# Patient Record
Sex: FEMALE | Race: WHITE | NOT HISPANIC OR LATINO | URBAN - METROPOLITAN AREA
[De-identification: names, ages, dates, MRNs, and addresses within clinical notes are randomized per-mention and may not be internally consistent; named-entity substitution may affect disease eponyms.]

---

## 2017-06-19 ENCOUNTER — OUTPATIENT (OUTPATIENT)
Dept: OUTPATIENT SERVICES | Facility: HOSPITAL | Age: 72
LOS: 1 days | Discharge: HOME | End: 2017-06-19

## 2017-06-19 DIAGNOSIS — C50.919 MALIGNANT NEOPLASM OF UNSPECIFIED SITE OF UNSPECIFIED FEMALE BREAST: ICD-10-CM

## 2017-06-22 ENCOUNTER — RX RENEWAL (OUTPATIENT)
Age: 72
End: 2017-06-22

## 2017-06-22 ENCOUNTER — RESULT REVIEW (OUTPATIENT)
Age: 72
End: 2017-06-22

## 2017-06-22 ENCOUNTER — APPOINTMENT (OUTPATIENT)
Dept: HEMATOLOGY ONCOLOGY | Facility: CLINIC | Age: 72
End: 2017-06-22

## 2017-06-22 VITALS
HEART RATE: 60 BPM | WEIGHT: 154.32 LBS | RESPIRATION RATE: 16 BRPM | BODY MASS INDEX: 24.72 KG/M2 | SYSTOLIC BLOOD PRESSURE: 120 MMHG | OXYGEN SATURATION: 97 % | DIASTOLIC BLOOD PRESSURE: 60 MMHG | TEMPERATURE: 97.7 F

## 2017-06-22 LAB
HCT VFR BLD CALC: 38 % — SIGNIFICANT CHANGE UP (ref 34.5–45)
HGB BLD-MCNC: 13.1 G/DL — SIGNIFICANT CHANGE UP (ref 11.5–15.5)
MCHC RBC-ENTMCNC: 31.8 PG — SIGNIFICANT CHANGE UP (ref 27–34)
MCHC RBC-ENTMCNC: 34.5 G/DL — SIGNIFICANT CHANGE UP (ref 32–36)
MCV RBC AUTO: 92.1 FL — SIGNIFICANT CHANGE UP (ref 80–100)
PLATELET # BLD AUTO: 301 K/UL — SIGNIFICANT CHANGE UP (ref 150–400)
RBC # BLD: 4.12 M/UL — SIGNIFICANT CHANGE UP (ref 3.8–5.2)
RBC # FLD: 12.2 % — SIGNIFICANT CHANGE UP (ref 10.3–14.5)
WBC # BLD: 8.6 K/UL — SIGNIFICANT CHANGE UP (ref 3.8–10.5)
WBC # FLD AUTO: 8.6 K/UL — SIGNIFICANT CHANGE UP (ref 3.8–10.5)

## 2017-07-14 ENCOUNTER — APPOINTMENT (OUTPATIENT)
Dept: CARDIOLOGY | Facility: CLINIC | Age: 72
End: 2017-07-14

## 2017-11-15 ENCOUNTER — MEDICATION RENEWAL (OUTPATIENT)
Age: 72
End: 2017-11-15

## 2017-12-13 ENCOUNTER — OUTPATIENT (OUTPATIENT)
Dept: OUTPATIENT SERVICES | Facility: HOSPITAL | Age: 72
LOS: 1 days | Discharge: HOME | End: 2017-12-13

## 2017-12-13 DIAGNOSIS — C50.919 MALIGNANT NEOPLASM OF UNSPECIFIED SITE OF UNSPECIFIED FEMALE BREAST: ICD-10-CM

## 2017-12-19 ENCOUNTER — APPOINTMENT (OUTPATIENT)
Dept: HEMATOLOGY ONCOLOGY | Facility: CLINIC | Age: 72
End: 2017-12-19
Payer: MEDICARE

## 2017-12-19 VITALS
SYSTOLIC BLOOD PRESSURE: 130 MMHG | RESPIRATION RATE: 16 BRPM | DIASTOLIC BLOOD PRESSURE: 71 MMHG | OXYGEN SATURATION: 98 % | TEMPERATURE: 99.2 F | BODY MASS INDEX: 24.05 KG/M2 | WEIGHT: 150.13 LBS | HEART RATE: 78 BPM

## 2017-12-19 DIAGNOSIS — E21.3 HYPERPARATHYROIDISM, UNSPECIFIED: ICD-10-CM

## 2017-12-19 PROCEDURE — 99215 OFFICE O/P EST HI 40 MIN: CPT

## 2017-12-19 RX ORDER — ALENDRONATE SODIUM 70 MG/1
70 TABLET ORAL
Refills: 0 | Status: DISCONTINUED | COMMUNITY
Start: 2017-06-22 | End: 2017-12-19

## 2017-12-19 RX ORDER — NYSTATIN 100000 [USP'U]/G
100000 CREAM TOPICAL
Qty: 30 | Refills: 0 | Status: ACTIVE | COMMUNITY
Start: 2017-07-13

## 2018-04-05 ENCOUNTER — MEDICATION RENEWAL (OUTPATIENT)
Age: 73
End: 2018-04-05

## 2018-06-04 ENCOUNTER — OUTPATIENT (OUTPATIENT)
Dept: OUTPATIENT SERVICES | Facility: HOSPITAL | Age: 73
LOS: 1 days | Discharge: HOME | End: 2018-06-04

## 2018-06-04 DIAGNOSIS — C50.919 MALIGNANT NEOPLASM OF UNSPECIFIED SITE OF UNSPECIFIED FEMALE BREAST: ICD-10-CM

## 2018-06-07 ENCOUNTER — APPOINTMENT (OUTPATIENT)
Dept: HEMATOLOGY ONCOLOGY | Facility: CLINIC | Age: 73
End: 2018-06-07
Payer: MEDICARE

## 2018-06-07 VITALS
HEART RATE: 78 BPM | SYSTOLIC BLOOD PRESSURE: 110 MMHG | TEMPERATURE: 98.8 F | WEIGHT: 145.48 LBS | DIASTOLIC BLOOD PRESSURE: 70 MMHG | BODY MASS INDEX: 23.31 KG/M2 | OXYGEN SATURATION: 98 % | RESPIRATION RATE: 16 BRPM

## 2018-06-07 PROCEDURE — 99214 OFFICE O/P EST MOD 30 MIN: CPT

## 2018-07-06 ENCOUNTER — APPOINTMENT (OUTPATIENT)
Dept: CARDIOLOGY | Facility: CLINIC | Age: 73
End: 2018-07-06

## 2018-08-24 ENCOUNTER — APPOINTMENT (OUTPATIENT)
Dept: CARDIOLOGY | Facility: CLINIC | Age: 73
End: 2018-08-24
Payer: MEDICARE

## 2018-08-24 VITALS
OXYGEN SATURATION: 99 % | SYSTOLIC BLOOD PRESSURE: 110 MMHG | HEART RATE: 68 BPM | WEIGHT: 147 LBS | BODY MASS INDEX: 23.63 KG/M2 | HEIGHT: 66 IN | DIASTOLIC BLOOD PRESSURE: 52 MMHG

## 2018-08-24 DIAGNOSIS — Z01.810 ENCOUNTER FOR PREPROCEDURAL CARDIOVASCULAR EXAMINATION: ICD-10-CM

## 2018-08-24 DIAGNOSIS — I34.1 NONRHEUMATIC MITRAL (VALVE) PROLAPSE: ICD-10-CM

## 2018-08-24 DIAGNOSIS — R09.89 OTHER SPECIFIED SYMPTOMS AND SIGNS INVOLVING THE CIRCULATORY AND RESPIRATORY SYSTEMS: ICD-10-CM

## 2018-08-24 DIAGNOSIS — I65.23 OCCLUSION AND STENOSIS OF BILATERAL CAROTID ARTERIES: ICD-10-CM

## 2018-08-24 PROCEDURE — 99214 OFFICE O/P EST MOD 30 MIN: CPT

## 2018-12-06 ENCOUNTER — OUTPATIENT (OUTPATIENT)
Dept: OUTPATIENT SERVICES | Facility: HOSPITAL | Age: 73
LOS: 1 days | Discharge: HOME | End: 2018-12-06

## 2018-12-06 DIAGNOSIS — C50.919 MALIGNANT NEOPLASM OF UNSPECIFIED SITE OF UNSPECIFIED FEMALE BREAST: ICD-10-CM

## 2018-12-12 ENCOUNTER — APPOINTMENT (OUTPATIENT)
Dept: HEMATOLOGY ONCOLOGY | Facility: CLINIC | Age: 73
End: 2018-12-12
Payer: MEDICARE

## 2018-12-12 ENCOUNTER — RESULT REVIEW (OUTPATIENT)
Age: 73
End: 2018-12-12

## 2018-12-12 VITALS
RESPIRATION RATE: 16 BRPM | TEMPERATURE: 97.9 F | WEIGHT: 148.59 LBS | BODY MASS INDEX: 23.98 KG/M2 | HEART RATE: 63 BPM | SYSTOLIC BLOOD PRESSURE: 134 MMHG | DIASTOLIC BLOOD PRESSURE: 75 MMHG | OXYGEN SATURATION: 97 %

## 2018-12-12 DIAGNOSIS — D64.9 ANEMIA, UNSPECIFIED: ICD-10-CM

## 2018-12-12 LAB
BASOPHILS # BLD AUTO: 0.1 K/UL — SIGNIFICANT CHANGE UP (ref 0–0.2)
BASOPHILS NFR BLD AUTO: 0.9 % — SIGNIFICANT CHANGE UP (ref 0–2)
DEPRECATED KAPPA LC FREE/LAMBDA SER: 1.71 RATIO
EOSINOPHIL # BLD AUTO: 0.1 K/UL — SIGNIFICANT CHANGE UP (ref 0–0.5)
EOSINOPHIL NFR BLD AUTO: 1.6 % — SIGNIFICANT CHANGE UP (ref 0–6)
FERRITIN SERPL-MCNC: 168 NG/ML
FOLATE SERPL-MCNC: >20 NG/ML
HCT VFR BLD CALC: 37.2 % — SIGNIFICANT CHANGE UP (ref 34.5–45)
HGB BLD-MCNC: 12.1 G/DL — SIGNIFICANT CHANGE UP (ref 11.5–15.5)
IRON SATN MFR SERPL: 26 %
IRON SERPL-MCNC: 82 UG/DL
KAPPA LC CSF-MCNC: 1.08 MG/DL
KAPPA LC SERPL-MCNC: 1.85 MG/DL
LYMPHOCYTES # BLD AUTO: 1.9 K/UL — SIGNIFICANT CHANGE UP (ref 1–3.3)
LYMPHOCYTES # BLD AUTO: 23.9 % — SIGNIFICANT CHANGE UP (ref 13–44)
MCHC RBC-ENTMCNC: 29.9 PG — SIGNIFICANT CHANGE UP (ref 27–34)
MCHC RBC-ENTMCNC: 32.6 G/DL — SIGNIFICANT CHANGE UP (ref 32–36)
MCV RBC AUTO: 91.6 FL — SIGNIFICANT CHANGE UP (ref 80–100)
MONOCYTES # BLD AUTO: 0.8 K/UL — SIGNIFICANT CHANGE UP (ref 0–0.9)
MONOCYTES NFR BLD AUTO: 9.2 % — SIGNIFICANT CHANGE UP (ref 2–14)
NEUTROPHILS # BLD AUTO: 5.2 K/UL — SIGNIFICANT CHANGE UP (ref 1.8–7.4)
NEUTROPHILS NFR BLD AUTO: 64.3 % — SIGNIFICANT CHANGE UP (ref 43–77)
OB PNL STL: NEGATIVE — SIGNIFICANT CHANGE UP
PLATELET # BLD AUTO: 292 K/UL — SIGNIFICANT CHANGE UP (ref 150–400)
RBC # BLD: 4.06 M/UL — SIGNIFICANT CHANGE UP (ref 3.8–5.2)
RBC # FLD: 12.2 % — SIGNIFICANT CHANGE UP (ref 10.3–14.5)
RETICS #: 57.3 K/UL — SIGNIFICANT CHANGE UP (ref 25–125)
RETICS/RBC NFR: 1.4 % — SIGNIFICANT CHANGE UP (ref 0.5–2.5)
TIBC SERPL-MCNC: 310 UG/DL
UIBC SERPL-MCNC: 228 UG/DL
VIT B12 SERPL-MCNC: 486 PG/ML
WBC # BLD: 8.1 K/UL — SIGNIFICANT CHANGE UP (ref 3.8–10.5)
WBC # FLD AUTO: 8.1 K/UL — SIGNIFICANT CHANGE UP (ref 3.8–10.5)

## 2018-12-12 PROCEDURE — 99214 OFFICE O/P EST MOD 30 MIN: CPT

## 2018-12-12 RX ORDER — CALCIUM CARBONATE 500(1250)
500 TABLET ORAL
Refills: 0 | Status: ACTIVE | COMMUNITY

## 2018-12-12 NOTE — PHYSICAL EXAM
[Fully active, able to carry on all pre-disease performance without restriction] : Status 0 - Fully active, able to carry on all pre-disease performance without restriction [Normal] : normal external exam, normal sphincter tone; no palpable masses; stool guaiac negative [de-identified] : Asymmetry left breast larger than right.  Right breast: Scar UOQ with moderate retraction , scar right axilla, 2 cm area diffuse fullness UOQ without discrete mass. Left:  two parallel scars upper chest wall. no left breast masses.

## 2018-12-12 NOTE — HISTORY OF PRESENT ILLNESS
[Disease: _____________________] : Disease: [unfilled] [T: ___] : T[unfilled] [N: ___] : N[unfilled] [M: ___] : M[unfilled] [AJCC Stage: ____] : AJCC Stage: [unfilled] [Treatment Protocol] : Treatment Protocol [de-identified] : The patient presented 2010, at the age of 65, with an abnormal mammogram. \par  \par Ultimately she underwent right lumpectomy and sentinel lymph node biopsy performed by Dr Susan Palleschi on 7/27/2010 for a 2.1 cm poorly differentiated infiltrating ductal carcinoma which was ER+ (90%), [place] weakly + (4%) and Her 2/zoraida -. There were 2 negative sentinel lymph nodes.Oncotype DX recurrence score was 24. \par  \par The patient received 4 cycles of TC chemotherapy (10/5-12/7/2010).  this was followed by RT at [facility]. \par  \par she has been taking anastrozole since 4/1/2011. \par  \par  [de-identified] : Infiltrating ductal carcinoma Oncotype DX recurrence score 24 [FreeTextEntry1] : anastrozole start 4/1/2011. [de-identified] : The patient has been on anastrozole for 7 years 8  months.\par \par  The patient completed chemotherapy Taxotere and Cytoxan 9  years ago\par \par Most recent breast MRI was 12/28/2016, stable, BIRADS 2.\par \par  The patient saw Dr Palleschi's PA  12/2018, has appointment with Dr Palleschi next week.Next f/u 12/14/2018.\par \par  The patient  states she had mammogram/ breast ultrasound was 8/30/2018 and was advised of normal findings, will request report.\par \par Most recent visit to  endocrinologist Dr Melchor Mello 10/20/2018 for f/u and management of her hyperparathyroidism, exam stable.\par \par The patient is s/p parathyroidectomy 09/10/2018. Most recent  post op visit 10/2018\par \par Has "annoying" back pain with prolonged standing. Had  Xrays and MRI spine, result benign. The patient states she no longer has pain.\par \par  Last saw Dr Margaret Ruvalcaba, pulmonary medicine 06/2018 for f/u nodules. Has follow up scheduled next week..\par \par Overall the patient states she feels well since last seen.\par \par No other interval events since last visit.

## 2018-12-12 NOTE — REVIEW OF SYSTEMS
[Negative] : Allergic/Immunologic [FreeTextEntry2] : The patient states " I feel good , I feel great, energy is good.Still  does all ADLs and walking.. S/P flu vaccination 10/2018.  [FreeTextEntry3] : Last  eye exam  06/2018 with benign findings [FreeTextEntry6] : see interval history. [FreeTextEntry7] : Most recent colonoscopy 12/7/2017 ,6 mm tubular adenoma found. Appetite is good. [FreeTextEntry8] : Last GYN exam 8/8/2017.Next f/u 2019 No bleeding or spotting.. [FreeTextEntry9] : Hyperparathyroidism,  see interval history. [de-identified] : See interval history.

## 2018-12-13 LAB
ALBUMIN MFR SERPL ELPH: 60.3 %
ALBUMIN SERPL-MCNC: 4.3 G/DL
ALBUMIN/GLOB SERPL: 1.5 RATIO
ALPHA1 GLOB MFR SERPL ELPH: 4.6 %
ALPHA1 GLOB SERPL ELPH-MCNC: 0.3 G/DL
ALPHA2 GLOB MFR SERPL ELPH: 11.7 %
ALPHA2 GLOB SERPL ELPH-MCNC: 0.8 G/DL
B-GLOBULIN MFR SERPL ELPH: 10.9 %
B-GLOBULIN SERPL ELPH-MCNC: 0.8 G/DL
DEPRECATED KAPPA LC FREE/LAMBDA SER: 1.71 RATIO
GAMMA GLOB FLD ELPH-MCNC: 0.9 G/DL
GAMMA GLOB MFR SERPL ELPH: 12.5 %
IGA SER QL IEP: 190 MG/DL
IGG SER QL IEP: 1006 MG/DL
IGM SER QL IEP: 28 MG/DL
INTERPRETATION SERPL IEP-IMP: NORMAL
KAPPA LC CSF-MCNC: 1.08 MG/DL
KAPPA LC SERPL-MCNC: 1.85 MG/DL
M PROTEIN SPEC IFE-MCNC: NORMAL
PROT SERPL-MCNC: 7.1 G/DL
PROT SERPL-MCNC: 7.1 G/DL

## 2019-04-16 ENCOUNTER — RX RENEWAL (OUTPATIENT)
Age: 74
End: 2019-04-16

## 2019-06-18 ENCOUNTER — OUTPATIENT (OUTPATIENT)
Dept: OUTPATIENT SERVICES | Facility: HOSPITAL | Age: 74
LOS: 1 days | Discharge: HOME | End: 2019-06-18

## 2019-06-18 DIAGNOSIS — C50.919 MALIGNANT NEOPLASM OF UNSPECIFIED SITE OF UNSPECIFIED FEMALE BREAST: ICD-10-CM

## 2019-06-21 ENCOUNTER — APPOINTMENT (OUTPATIENT)
Dept: HEMATOLOGY ONCOLOGY | Facility: CLINIC | Age: 74
End: 2019-06-21
Payer: MEDICARE

## 2019-06-21 ENCOUNTER — RESULT REVIEW (OUTPATIENT)
Age: 74
End: 2019-06-21

## 2019-06-21 VITALS
OXYGEN SATURATION: 98 % | WEIGHT: 151.02 LBS | RESPIRATION RATE: 16 BRPM | HEART RATE: 66 BPM | DIASTOLIC BLOOD PRESSURE: 83 MMHG | SYSTOLIC BLOOD PRESSURE: 134 MMHG | TEMPERATURE: 98.8 F

## 2019-06-21 DIAGNOSIS — R07.81 PLEURODYNIA: ICD-10-CM

## 2019-06-21 LAB
BASOPHILS # BLD AUTO: 0.1 K/UL — SIGNIFICANT CHANGE UP (ref 0–0.2)
BASOPHILS NFR BLD AUTO: 0.7 % — SIGNIFICANT CHANGE UP (ref 0–2)
EOSINOPHIL # BLD AUTO: 0.1 K/UL — SIGNIFICANT CHANGE UP (ref 0–0.5)
EOSINOPHIL NFR BLD AUTO: 1.2 % — SIGNIFICANT CHANGE UP (ref 0–6)
HCT VFR BLD CALC: 36.7 % — SIGNIFICANT CHANGE UP (ref 34.5–45)
HGB BLD-MCNC: 12 G/DL — SIGNIFICANT CHANGE UP (ref 11.5–15.5)
LYMPHOCYTES # BLD AUTO: 1.7 K/UL — SIGNIFICANT CHANGE UP (ref 1–3.3)
LYMPHOCYTES # BLD AUTO: 19.5 % — SIGNIFICANT CHANGE UP (ref 13–44)
MCHC RBC-ENTMCNC: 30.7 PG — SIGNIFICANT CHANGE UP (ref 27–34)
MCHC RBC-ENTMCNC: 32.8 G/DL — SIGNIFICANT CHANGE UP (ref 32–36)
MCV RBC AUTO: 93.4 FL — SIGNIFICANT CHANGE UP (ref 80–100)
MONOCYTES # BLD AUTO: 0.7 K/UL — SIGNIFICANT CHANGE UP (ref 0–0.9)
MONOCYTES NFR BLD AUTO: 8.5 % — SIGNIFICANT CHANGE UP (ref 2–14)
NEUTROPHILS # BLD AUTO: 6 K/UL — SIGNIFICANT CHANGE UP (ref 1.8–7.4)
NEUTROPHILS NFR BLD AUTO: 70 % — SIGNIFICANT CHANGE UP (ref 43–77)
PLATELET # BLD AUTO: 317 K/UL — SIGNIFICANT CHANGE UP (ref 150–400)
RBC # BLD: 3.93 M/UL — SIGNIFICANT CHANGE UP (ref 3.8–5.2)
RBC # FLD: 13.2 % — SIGNIFICANT CHANGE UP (ref 10.3–14.5)
WBC # BLD: 8.5 K/UL — SIGNIFICANT CHANGE UP (ref 3.8–10.5)
WBC # FLD AUTO: 8.5 K/UL — SIGNIFICANT CHANGE UP (ref 3.8–10.5)

## 2019-06-21 PROCEDURE — 99214 OFFICE O/P EST MOD 30 MIN: CPT

## 2019-06-25 ENCOUNTER — RX RENEWAL (OUTPATIENT)
Age: 74
End: 2019-06-25

## 2019-07-01 LAB
ALBUMIN SERPL ELPH-MCNC: 4.8 G/DL
ALP BLD-CCNC: 78 U/L
ALT SERPL-CCNC: 16 U/L
ANION GAP SERPL CALC-SCNC: 11 MMOL/L
AST SERPL-CCNC: 20 U/L
BILIRUB SERPL-MCNC: 0.4 MG/DL
BUN SERPL-MCNC: 9 MG/DL
CALCIUM SERPL-MCNC: 8.9 MG/DL
CHLORIDE SERPL-SCNC: 100 MMOL/L
CO2 SERPL-SCNC: 26 MMOL/L
CREAT SERPL-MCNC: 0.59 MG/DL
GLUCOSE SERPL-MCNC: 87 MG/DL
POTASSIUM SERPL-SCNC: 4.1 MMOL/L
PROT SERPL-MCNC: 6.9 G/DL
SODIUM SERPL-SCNC: 137 MMOL/L

## 2019-08-12 ENCOUNTER — RX CHANGE (OUTPATIENT)
Age: 74
End: 2019-08-12

## 2019-10-08 ENCOUNTER — NON-APPOINTMENT (OUTPATIENT)
Age: 74
End: 2019-10-08

## 2019-10-08 ENCOUNTER — APPOINTMENT (OUTPATIENT)
Dept: CARDIOLOGY | Facility: CLINIC | Age: 74
End: 2019-10-08
Payer: MEDICARE

## 2019-10-08 VITALS
WEIGHT: 156 LBS | SYSTOLIC BLOOD PRESSURE: 104 MMHG | BODY MASS INDEX: 25.07 KG/M2 | HEART RATE: 67 BPM | DIASTOLIC BLOOD PRESSURE: 68 MMHG | HEIGHT: 66 IN

## 2019-10-08 DIAGNOSIS — Z87.898 PERSONAL HISTORY OF OTHER SPECIFIED CONDITIONS: ICD-10-CM

## 2019-10-08 DIAGNOSIS — I77.810 THORACIC AORTIC ECTASIA: ICD-10-CM

## 2019-10-08 PROCEDURE — 93000 ELECTROCARDIOGRAM COMPLETE: CPT

## 2019-10-08 PROCEDURE — 99214 OFFICE O/P EST MOD 30 MIN: CPT

## 2019-10-08 NOTE — DISCUSSION/SUMMARY
[FreeTextEntry1] : 74-year-old female comes in for followup consultation. Recent extensive evaluation in [place] showed\par Borderline aortic root dilatation.\par Trace mitral regurgitation.\par Normal coronary CTA.\par No significant carotid atherosclerosis.\par Normal sinus rhythm on EKG.\par Excellent labs.\par At present. Recommended.\par Repeat echocardiogram for follow up on aortic root dimension.\par She will stop aspirin considering no significant evidence of obstructive vascular disease.\par She is on low dose of simvastatin, which she will stop at this point, considering excellent lipid panel, and no significant evidence of coronary or carotid asbestosis. There is a soft right femoral bruit. Pedal no significant evidence of obstructive peripheral artery disease.\par \par She will continue with lifestyle modifications.\par She has no signs of unstable CAD at present.\par \par Counseling regarding low saturated fat, salt and carbohydrate intake was reviewed. Active lifestyle and regular. Exercise along with weight management is advised.\par All the above were at length reviewed. Answered all the questions. Thank you very much for this kind referral. Please do not hesitate to give me a call for any question.\par Part of this transcription was done with voice recognition software and phonetically similar errors are common. I apologize for that. Please do not hesitate to call for any questions due to above.\par \par I will review echocardiogram on the phone. She will contact me after the echocardiogram. Other than that the followup will be done as needed.

## 2019-10-08 NOTE — HISTORY OF PRESENT ILLNESS
[FreeTextEntry1] : Medical History:\par Hyperlipidemia\par MVP: trace mr.\par right femoral bruits\par breast ca right s/p chemo and rt\par bronchiectasis\par hyperparathyroidism .

## 2019-10-08 NOTE — PHYSICAL EXAM
[General Appearance - Well Developed] : well developed [Normal Appearance] : normal appearance [Well Groomed] : well groomed [General Appearance - Well Nourished] : well nourished [No Deformities] : no deformities [Normal Conjunctiva] : the conjunctiva exhibited no abnormalities [General Appearance - In No Acute Distress] : no acute distress [Normal Oral Mucosa] : normal oral mucosa [Eyelids - No Xanthelasma] : the eyelids demonstrated no xanthelasmas [No Oral Pallor] : no oral pallor [No Oral Cyanosis] : no oral cyanosis [Normal Jugular Venous A Waves Present] : normal jugular venous A waves present [Normal Jugular Venous V Waves Present] : normal jugular venous V waves present [No Jugular Venous Solis A Waves] : no jugular venous solis A waves [Respiration, Rhythm And Depth] : normal respiratory rhythm and effort [Exaggerated Use Of Accessory Muscles For Inspiration] : no accessory muscle use [Auscultation Breath Sounds / Voice Sounds] : lungs were clear to auscultation bilaterally [Heart Rate And Rhythm] : heart rate and rhythm were normal [Heart Sounds] : normal S1 and S2 [Arterial Pulses Normal] : the arterial pulses were normal [Edema] : no peripheral edema present [Veins - Varicosity Changes] : no varicosital changes were noted in the lower extremities [Abdomen Soft] : soft [Abnormal Walk] : normal gait [Nail Clubbing] : no clubbing of the fingernails [Cyanosis, Localized] : no localized cyanosis [Oriented To Time, Place, And Person] : oriented to person, place, and time [] : no rash [Affect] : the affect was normal [Mood] : the mood was normal [No Anxiety] : not feeling anxious [FreeTextEntry1] : julia /6 at the base, femoral bruits, no gallop/rub/heave or click

## 2019-10-08 NOTE — ASSESSMENT
[FreeTextEntry1] : past tests for reference:\par \par Reviewed on July 14, 2017.\par EKG ordered and interpreted by me on July 14, 2017. Indication coronary artery disease. Interpretation sinus bradycardia. Nonspecific ST changes. Unchanged from before.\par Echocardiogram and stress echocardiogram from 2016 was reviewed.\par \par •EKG showed normal sinus rhythm with nonspecific ST-T wave changes with lead reversal noted. indication: ASVD\par \par Carotid Doppler study on June 24, 2014 mild nonobstructive disease bilaterally.\par \par Echocardiogram on June 24, 2014 EF around 60%, normal chamber sizes, mitral valve prolapse, trace mitral and tricuspid regurgitation noted.\par \par Reviewed August 25, 2018.\par EKG August 22, 2018. Her outside facility. Normal sinus rhythm.\par Labs August 17, 2018, blood pressure 4.4, creatinine 0.55. Calcium 10.5, hemoglobin 11.9.\par \par Reviewed on October 8, 2019.\par EKG, CTA of the coronary arteries, chest x-ray, carotid Doppler study, echocardiogram, labs from February 2019. We reviewed the

## 2019-10-08 NOTE — REASON FOR VISIT
[Carotid Artery Stenosis] : carotid stenosis [Hyperlipidemia] : hyperlipidemia [Mitral Regurgitation] : mitral regurgitation [Syncope] : syncope [Follow-Up - From Hospitalization] : follow-up of a recent hospitalization for [FreeTextEntry1] : 74-year-old female comes in for followup consultation. Since last seen she was admitted in [place] after syncopal event in February of 2090.\par Extensive evaluation was done, which included.\par EKG, which showed sinus arrhythmia, normal sinus rhythm.\par Labs were stable with CBC, CMP.\par Chest x-ray no active infiltrates.\par Carotid Doppler study was unremarkable.\par Echocardiogram showed preserved ejection fraction 65% trace mitral regurgitation. Trace aortic regurgitation. Aortic root at 4.0 cm. Mildly dilated.\par She had a coronary CTA, which was reported as normal.\par She was told she was dehydrated at that time.\par Since then. She has done well without any significant complaint of chest pain, shortness of breath, PND, orthopnea, palpitation, dizziness, lightheadedness, or syncopal event.\par Her weight, diet appetite is stable.\par \par She has had surgeries in the past without any significant problems with anesthesia.\par She has no family history of bleeding complications. P.

## 2019-10-08 NOTE — REVIEW OF SYSTEMS
[Feeling Fatigued] : feeling fatigued [Negative] : Heme/Lymph [Fever] : no fever [Headache] : no headache [Recent Weight Gain (___ Lbs)] : no recent weight gain [Chills] : no chills

## 2019-12-27 ENCOUNTER — OUTPATIENT (OUTPATIENT)
Dept: OUTPATIENT SERVICES | Facility: HOSPITAL | Age: 74
LOS: 1 days | Discharge: HOME | End: 2019-12-27

## 2019-12-27 DIAGNOSIS — C50.919 MALIGNANT NEOPLASM OF UNSPECIFIED SITE OF UNSPECIFIED FEMALE BREAST: ICD-10-CM

## 2020-01-02 ENCOUNTER — APPOINTMENT (OUTPATIENT)
Dept: HEMATOLOGY ONCOLOGY | Facility: CLINIC | Age: 75
End: 2020-01-02
Payer: MEDICARE

## 2020-01-02 VITALS
BODY MASS INDEX: 25.12 KG/M2 | DIASTOLIC BLOOD PRESSURE: 77 MMHG | WEIGHT: 155.65 LBS | TEMPERATURE: 97.9 F | SYSTOLIC BLOOD PRESSURE: 150 MMHG | OXYGEN SATURATION: 99 % | HEART RATE: 76 BPM | RESPIRATION RATE: 17 BRPM

## 2020-01-02 PROCEDURE — 99215 OFFICE O/P EST HI 40 MIN: CPT

## 2020-01-02 NOTE — REVIEW OF SYSTEMS
[Negative] : Allergic/Immunologic [FreeTextEntry2] : Has increased fatigue "I am always on the go", needs to rest during the day. S/P flu vaccination 10/2019. Had first Shingrix. [FreeTextEntry6] : see interval history. [FreeTextEntry7] : Most recent colonoscopy 12/7/2017,6 mm tubular adenoma found. Was told to repeat in 2021.  Has fecal urgency in the AM x 2 years. [FreeTextEntry8] : Last GYN exam 8/2019. No bleeding or spotting.. [FreeTextEntry9] : Hyperparathyroidism,  see interval history. patient was told to consider Prolia by endocrinologist. [de-identified] : See interval history.

## 2020-01-02 NOTE — PHYSICAL EXAM
[Fully active, able to carry on all pre-disease performance without restriction] : Status 0 - Fully active, able to carry on all pre-disease performance without restriction [Normal] : affect appropriate [de-identified] : Asymmetry left breast larger than right.  Right breast: Scar UOQ with moderate retraction , scar right axilla, 3 cm area diffuse fullness UOQ without discrete mass. Left:  two parallel scars upper chest wall. No left breast masses.

## 2020-01-02 NOTE — HISTORY OF PRESENT ILLNESS
[Disease: _____________________] : Disease: [unfilled] [T: ___] : T[unfilled] [N: ___] : N[unfilled] [M: ___] : M[unfilled] [AJCC Stage: ____] : AJCC Stage: [unfilled] [Treatment Protocol] : Treatment Protocol [de-identified] : The patient presented 2010, at the age of 65, with an abnormal mammogram. \par  \par Ultimately she underwent right lumpectomy and sentinel lymph node biopsy performed by Dr Susan Palleschi on 7/27/2010 for a 2.1 cm poorly differentiated infiltrating ductal carcinoma which was ER+ (90%), CT weakly + (4%) and Her 2/zoraida -. There were 2 negative sentinel lymph nodes.Oncotype DX recurrence score was 24. \par  \par The patient received 4 cycles of TC chemotherapy (10/5-12/7/2010).  This was followed by RT at [facility]. \par  \par she has been taking anastrozole since 4/1/2011. \par  \par  [de-identified] : Infiltrating ductal carcinoma Oncotype DX recurrence score 24 [FreeTextEntry1] : anastrozole start 4/1/2011. [de-identified] : The patient is 9 years 6 months post diagnosis of breast cancer.\par \par  The patient completed chemotherapy Taxotere and Cytoxan 9  years 1 month ago\par \par The patient has been on anastrozole for 8 years 9 months.\par \par Had mammogram/ breast ultrasound was 8/9/2019, stable, BI RADS 1.\par \par Right rib series to evaluate rib pain on 8/20/2019 negative.  Chest wall pain subsequently resolved, patient believes pain was related to wearing tight bras.\par \par  The patient saw Dr Palleschi last week 12/2019. Will be changed to annual follow up after her her 2/2020 visit.\par \par Most recent breast MRI was 12/28/2016, stable, BI RADS 2. No longer being followed with surveillance MRI.\par \par Saw cardiologist Dr Ortega 10/8/2019 for follow up of aortic root dilatation. Was discharged from follow up.\par \par Saw Dr Margaret Ruvalcaba in pulmonary follow up 2 weeks ago, had protracted cough related to previous sinus infection.  Cough has subsequently resolved.  Had follow up chest CT < 2 weeks ago. last week, was told  exam was stable. Report from 12/23/2019 reports changes consistent with bronchiectasis along with minimal enlargement of RLL nodule from 3 to 5 mm and new 3 mm LLL nodule.\par \par Most recent visit to  endocrinologist Dr Melchor Mello  8/2019.The patient is s/p parathyroidectomy 09/10/2018. Will follow up with him in spring 2020.\par \par No other interval events since last visit.

## 2020-01-22 ENCOUNTER — TRANSCRIPTION ENCOUNTER (OUTPATIENT)
Age: 75
End: 2020-01-22

## 2020-05-26 ENCOUNTER — APPOINTMENT (OUTPATIENT)
Dept: CARDIOLOGY | Facility: CLINIC | Age: 75
End: 2020-05-26

## 2020-06-24 ENCOUNTER — APPOINTMENT (OUTPATIENT)
Dept: CARDIOLOGY | Facility: CLINIC | Age: 75
End: 2020-06-24
Payer: MEDICARE

## 2020-06-24 PROCEDURE — 93306 TTE W/DOPPLER COMPLETE: CPT

## 2020-06-26 ENCOUNTER — OUTPATIENT (OUTPATIENT)
Dept: OUTPATIENT SERVICES | Facility: HOSPITAL | Age: 75
LOS: 1 days | Discharge: HOME | End: 2020-06-26

## 2020-06-26 DIAGNOSIS — C50.919 MALIGNANT NEOPLASM OF UNSPECIFIED SITE OF UNSPECIFIED FEMALE BREAST: ICD-10-CM

## 2020-07-20 ENCOUNTER — APPOINTMENT (OUTPATIENT)
Dept: HEMATOLOGY ONCOLOGY | Facility: CLINIC | Age: 75
End: 2020-07-20
Payer: MEDICARE

## 2020-07-20 DIAGNOSIS — D12.6 BENIGN NEOPLASM OF COLON, UNSPECIFIED: ICD-10-CM

## 2020-07-20 DIAGNOSIS — R93.89 ABNORMAL FINDINGS ON DIAGNOSTIC IMAGING OF OTHER SPECIFIED BODY STRUCTURES: ICD-10-CM

## 2020-07-20 PROCEDURE — 99214 OFFICE O/P EST MOD 30 MIN: CPT | Mod: 95

## 2020-07-31 NOTE — HISTORY OF PRESENT ILLNESS
[Disease: _____________________] : Disease: [unfilled] [T: ___] : T[unfilled] [N: ___] : N[unfilled] [M: ___] : M[unfilled] [AJCC Stage: ____] : AJCC Stage: [unfilled] [Treatment Protocol] : Treatment Protocol [Home] : at home, [unfilled] , at the time of the visit. [Medical Office: ([facility])___] : at the medical office located in  [Verbal consent obtained from patient] : the patient, [unfilled] [FreeTextEntry1] : anastrozole start 4/1/2011. [de-identified] : Infiltrating ductal carcinoma Oncotype DX recurrence score 24 [de-identified] : The patient presented 2010, at the age of 65, with an abnormal mammogram. \par  \par Ultimately she underwent right lumpectomy and sentinel lymph node biopsy performed by Dr Susan Palleschi on 7/27/2010 for a 2.1 cm poorly differentiated infiltrating ductal carcinoma which was ER+ (90%), [place] weakly + (4%) and Her 2/zoraida -. There were 2 negative sentinel lymph nodes.Oncotype DX recurrence score was 24. \par  \par The patient received 4 cycles of TC chemotherapy (10/5-12/7/2010).  This was followed by RT at [facility]. \par  \par she has been taking anastrozole since 4/1/2011. \par  \par  [de-identified] : The patient is 10  years  post diagnosis of breast cancer.\par \par  The patient completed chemotherapy Taxotere and Cytoxan 9  years 7 monthS ago\par \par The patient has been on anastrozole for 9 years 3  months.\par \par Had mammogram/ breast ultrasound was 8/9/2019, stable, BI RADS 1. Next scheduled mammogram/breast US  08/8/2020 \par  \par Right rib series to evaluate rib pain on 8/20/2019 negative.  Chest wall pain subsequently resolved, no chest wall pain since last seen. Believes pain was related to wearing tight bras.\par \par The patient saw Dr Palleschi  12/2019. Next f/u 07/2020.\par \par Most recent breast MRI was 12/28/2016, stable, BI RADS 2. No longer being followed with surveillance MRI.\par \par Saw Dr Margaret Ruvalcaba in pulmonary follow up 12/2019, had protracted cough related to previous sinus infection.  Cough has subsequently resolved.  Had follow up chest CT < 2  12/2019, was told  exam was stable. Report from 12/23/2019 reports changes consistent with bronchiectasis along with minimal enlargement of RLL nodule from 3 to 5 mm and new 3 mm LLL nodule.The radiologist recommended a 3 month follow up CT.\par The patient states her 2 f/u appointments with her pulmonologist was cancelled  due to COVID 19 pandemic. The patient states she plan to  make f/u appointment in order to obtain scripts for f/u chest CT\par \par Most recent visit to  endocrinologist Dr Melchor Mello  06/2020, exam stable. The patient states she had blood work done during this visits. Plan to send copy of result to Dr Gupta. The patient is s/p parathyroidectomy 09/10/2018. \par \par No other interval events since last visit.

## 2020-07-31 NOTE — REVIEW OF SYSTEMS
[Negative] : Allergic/Immunologic [FreeTextEntry2] : Fatigue has lessened, now able to  rest during the day. S/P flu vaccination 10/2019. Had first Shingrix. [FreeTextEntry5] : Saw cardiologist Dr Ortega  12/2019 for follow up of aortic root dilatation. Was discharged from follow up. [FreeTextEntry6] : see interval history. [FreeTextEntry3] : Had eye test done  recently at [facility].Will be evaluated by an ophthalmologist 2021 [FreeTextEntry7] : Most recent colonoscopy 12/7/2017,6 mm tubular adenoma found. Was told to repeat in 2021.  Has fecal urgency in the AM x 2 years plus. [FreeTextEntry8] : Last GYN exam 8/2019. No bleeding or spotting.. [FreeTextEntry9] : Hyperparathyroidism,  see interval history. patient was told to consider Prolia by endocrinologist. [de-identified] : See interval history.

## 2020-07-31 NOTE — HISTORY OF PRESENT ILLNESS
[Disease: _____________________] : Disease: [unfilled] [T: ___] : T[unfilled] [N: ___] : N[unfilled] [M: ___] : M[unfilled] [AJCC Stage: ____] : AJCC Stage: [unfilled] [Treatment Protocol] : Treatment Protocol [Home] : at home, [unfilled] , at the time of the visit. [Verbal consent obtained from patient] : the patient, [unfilled] [Medical Office: ([facility])___] : at the medical office located in  [FreeTextEntry1] : anastrozole start 4/1/2011. [de-identified] : The patient presented 2010, at the age of 65, with an abnormal mammogram. \par  \par Ultimately she underwent right lumpectomy and sentinel lymph node biopsy performed by Dr Susan Palleschi on 7/27/2010 for a 2.1 cm poorly differentiated infiltrating ductal carcinoma which was ER+ (90%), [place] weakly + (4%) and Her 2/zoraida -. There were 2 negative sentinel lymph nodes.Oncotype DX recurrence score was 24. \par  \par The patient received 4 cycles of TC chemotherapy (10/5-12/7/2010).  This was followed by RT at [facility]. \par  \par she has been taking anastrozole since 4/1/2011. \par  \par  [de-identified] : Infiltrating ductal carcinoma Oncotype DX recurrence score 24 [de-identified] : The patient is 10  years  post diagnosis of breast cancer.\par \par  The patient completed chemotherapy Taxotere and Cytoxan 9  years 7 monthS ago\par \par The patient has been on anastrozole for 9 years 3  months.\par \par Had mammogram/ breast ultrasound was 8/9/2019, stable, BI RADS 1. Next scheduled mammogram/breast US  08/8/2020 \par  \par Right rib series to evaluate rib pain on 8/20/2019 negative.  Chest wall pain subsequently resolved, no chest wall pain since last seen. Believes pain was related to wearing tight bras.\par \par The patient saw Dr Palleschi  12/2019. Next f/u 07/2020.\par \par Most recent breast MRI was 12/28/2016, stable, BI RADS 2. No longer being followed with surveillance MRI.\par \par Saw Dr Margaret Ruvalcaba in pulmonary follow up 12/2019, had protracted cough related to previous sinus infection.  Cough has subsequently resolved.  Had follow up chest CT < 2  12/2019, was told  exam was stable. Report from 12/23/2019 reports changes consistent with bronchiectasis along with minimal enlargement of RLL nodule from 3 to 5 mm and new 3 mm LLL nodule.The radiologist recommended a 3 month follow up CT.\par The patient states her 2 f/u appointments with her pulmonologist was cancelled  due to COVID 19 pandemic. The patient states she plan to  make f/u appointment in order to obtain scripts for f/u chest CT\par \par Most recent visit to  endocrinologist Dr Melchor Mello  06/2020, exam stable. The patient states she had blood work done during this visits. Plan to send copy of result to Dr Gupta. The patient is s/p parathyroidectomy 09/10/2018. \par \par No other interval events since last visit.

## 2020-07-31 NOTE — REVIEW OF SYSTEMS
[Negative] : Allergic/Immunologic [FreeTextEntry2] : Fatigue has lessened, now able to  rest during the day. S/P flu vaccination 10/2019. Had first Shingrix. [FreeTextEntry7] : Most recent colonoscopy 12/7/2017,6 mm tubular adenoma found. Was told to repeat in 2021.  Has fecal urgency in the AM x 2 years plus. [FreeTextEntry3] : Had eye test done  recently at [facility].Will be evaluated by an ophthalmologist 2021 [FreeTextEntry5] : Saw cardiologist Dr Ortega  12/2019 for follow up of aortic root dilatation. Was discharged from follow up. [FreeTextEntry6] : see interval history. [FreeTextEntry9] : Hyperparathyroidism,  see interval history. patient was told to consider Prolia by endocrinologist. [FreeTextEntry8] : Last GYN exam 8/2019. No bleeding or spotting.. [de-identified] : See interval history.

## 2020-12-08 ENCOUNTER — APPOINTMENT (OUTPATIENT)
Dept: HEMATOLOGY ONCOLOGY | Facility: CLINIC | Age: 75
End: 2020-12-08

## 2020-12-17 ENCOUNTER — OUTPATIENT (OUTPATIENT)
Dept: OUTPATIENT SERVICES | Facility: HOSPITAL | Age: 75
LOS: 1 days | Discharge: HOME | End: 2020-12-17

## 2020-12-17 DIAGNOSIS — C50.919 MALIGNANT NEOPLASM OF UNSPECIFIED SITE OF UNSPECIFIED FEMALE BREAST: ICD-10-CM

## 2020-12-21 ENCOUNTER — APPOINTMENT (OUTPATIENT)
Dept: HEMATOLOGY ONCOLOGY | Facility: CLINIC | Age: 75
End: 2020-12-21
Payer: MEDICARE

## 2020-12-21 DIAGNOSIS — E55.9 VITAMIN D DEFICIENCY, UNSPECIFIED: ICD-10-CM

## 2020-12-21 PROCEDURE — 99215 OFFICE O/P EST HI 40 MIN: CPT | Mod: 95

## 2020-12-21 RX ORDER — FLUTICASONE PROPIONATE 50 UG/1
50 SPRAY, METERED NASAL
Qty: 16 | Refills: 0 | Status: DISCONTINUED | COMMUNITY
Start: 2020-09-25

## 2020-12-21 RX ORDER — LEVOFLOXACIN 500 MG/1
500 TABLET, FILM COATED ORAL
Qty: 14 | Refills: 0 | Status: DISCONTINUED | COMMUNITY
Start: 2020-10-08

## 2020-12-21 RX ORDER — SODIUM SULFATE, POTASSIUM SULFATE, MAGNESIUM SULFATE 17.5; 3.13; 1.6 G/ML; G/ML; G/ML
17.5-3.13-1.6 SOLUTION, CONCENTRATE ORAL
Qty: 354 | Refills: 0 | Status: DISCONTINUED | COMMUNITY
Start: 2020-11-09

## 2021-06-17 ENCOUNTER — OUTPATIENT (OUTPATIENT)
Dept: OUTPATIENT SERVICES | Facility: HOSPITAL | Age: 76
LOS: 1 days | Discharge: HOME | End: 2021-06-17

## 2021-06-17 DIAGNOSIS — C50.919 MALIGNANT NEOPLASM OF UNSPECIFIED SITE OF UNSPECIFIED FEMALE BREAST: ICD-10-CM

## 2021-12-03 ENCOUNTER — OUTPATIENT (OUTPATIENT)
Dept: OUTPATIENT SERVICES | Facility: HOSPITAL | Age: 76
LOS: 1 days | Discharge: HOME | End: 2021-12-03

## 2021-12-03 DIAGNOSIS — C50.919 MALIGNANT NEOPLASM OF UNSPECIFIED SITE OF UNSPECIFIED FEMALE BREAST: ICD-10-CM

## 2021-12-06 ENCOUNTER — APPOINTMENT (OUTPATIENT)
Dept: HEMATOLOGY ONCOLOGY | Facility: CLINIC | Age: 76
End: 2021-12-06
Payer: MEDICARE

## 2021-12-06 VITALS
RESPIRATION RATE: 16 BRPM | SYSTOLIC BLOOD PRESSURE: 126 MMHG | TEMPERATURE: 97.6 F | BODY MASS INDEX: 24.45 KG/M2 | HEIGHT: 65.98 IN | WEIGHT: 152.12 LBS | DIASTOLIC BLOOD PRESSURE: 74 MMHG | HEART RATE: 76 BPM | OXYGEN SATURATION: 96 %

## 2021-12-06 DIAGNOSIS — M81.0 AGE-RELATED OSTEOPOROSIS W/OUT CURRENT PATHOLOGICAL FRACTURE: ICD-10-CM

## 2021-12-06 DIAGNOSIS — E78.5 HYPERLIPIDEMIA, UNSPECIFIED: ICD-10-CM

## 2021-12-06 PROCEDURE — 99213 OFFICE O/P EST LOW 20 MIN: CPT

## 2021-12-06 RX ORDER — SIMVASTATIN 20 MG/1
20 TABLET, FILM COATED ORAL
Qty: 30 | Refills: 0 | Status: ACTIVE | COMMUNITY
Start: 2021-06-11

## 2021-12-06 NOTE — ASSESSMENT
[FreeTextEntry1] : Ms. MATY LAGUNA  is here for a follow up appt for right breast cancer dx in 2010, and is on treatment with AI since 4/2011. She is a former patient of Dr. Gupta, tx care to me 12/2020\par She completed anastrozole x 10 yrs 4/2021\par mammogram -  8/2021 : BIRDAS 3, 6 m f/u , she scheduled for 5/2022. SHe will be in [place] and delayed. Script from Dr Palleschi. gOES TO nrad\par DEXA- 8/2019 osteoporosis.  She is followed by an endocrinologist.  Prolia has been discussed with the patient in the past.  I discussed bone density results with her and strongly encouraged to consider treatment for osteoporosis.  She wishes to follow-up with her endocrinologist regarding Prolia.  She takes ca+vit D. SHE DECIDED NOT TO TAKE PROLIA UNDERSTANDING THE RISK. Repeat DEXA 5/2022\par She is also diagnosed with hypercholesterolemia and has recently started simvastatin.\par Up to date with age appropriate malignancy screen \par Healthy diet and exercise d/w her\par RTC 1 y\par \par

## 2021-12-06 NOTE — HISTORY OF PRESENT ILLNESS
[Disease: _____________________] : Disease: [unfilled] [T: ___] : T[unfilled] [N: ___] : N[unfilled] [M: ___] : M[unfilled] [AJCC Stage: ____] : AJCC Stage: [unfilled] [Treatment Protocol] : Treatment Protocol [de-identified] : 74 y/o female who presents for medical oncology follow up.\par \par The patient presented 2010, at the age of 65, with an abnormal mammogram. \par \par Ultimately she underwent right lumpectomy and sentinel lymph node biopsy performed by Dr Susan Palleschi on 7/27/2010 for a 2.1 cm poorly differentiated infiltrating ductal carcinoma, which was ER+ (90%), [place] weakly + (4%) and Her 2/zoraida -. There were 2 negative sentinel lymph nodes. Oncotype DX recurrence score was 24. \par \par The patient received 4 cycles of TC chemotherapy (10/5-12/7/2010).  This was followed by RT at [facility]. \par \par She has been taking anastrozole since 4/1/2011.   \par \par Pertinent History:\par PMD: Dr. Palleschi\par Cardio: Dr. Ortega- Carotid stenosis, HLD, MR\par Endo: Dr. Mello-Hyperparathyroidism s/p parathyroidectomy 9/10/18\par DEXA, 8/20/19- Osteoporosis in forearm, osteopenia in AP Spine, Femoral neck, and total hip\par \par 12.2020: We discussed that she will complete 10 years of endocrine therapy in April/2021.  I recommend her to stop treatment at that point.  There is no data to take adjuvant endocrine therapy beyond 10 years and patient has side effects in terms of her bones and cholesterol.  Patient was in agreement to stop endocrine therapy in April 2021.  Blood work was reviewed from November 2020.\par \par  [de-identified] : Infiltrating ductal carcinoma  [de-identified] : ER+ (90%), [place] weakly + (4%) and Her 2/zoraida - [de-identified] : Oncotype DX recurrence score 24 [FreeTextEntry1] : anastrozole start 4/1/2011 - 4/2021 [de-identified] : Ms. MATY LAGUNA  is here for a follow up appt for right breast cancer dx in 2010, and is on treatment with AI since 4/2011. She is a former patient of Dr. Gupta, tx care to me 12/2020\par She completed anastrozole x 10 yrs 4/2021\par mammogram -  8/2021 : BIRDAS 3, 6 m f/u , she scheduled for 5/2022. SHe will be in [place] and delayed. Script from Dr Palleschi. gOES TO nrad\par DEXA- 8/2019 osteoporosis.  She is followed by an endocrinologist.  Prolia has been discussed with the patient in the past.  I discussed bone density results with her and strongly encouraged to consider treatment for osteoporosis.  She wishes to follow-up with her endocrinologist regarding Prolia.  She takes ca+vit D. SHE DECIDED NOT TO TAKE PROLIA UNDERSTANDING THE RISK. Repeat DEXA 5/2022\par She is also diagnosed with hypercholesterolemia and has recently started simvastatin.\par Up to date with age appropriate malignancy screen \par

## 2021-12-06 NOTE — PHYSICAL EXAM
[Fully active, able to carry on all pre-disease performance without restriction] : Status 0 - Fully active, able to carry on all pre-disease performance without restriction [Normal] : affect appropriate [de-identified] : right breast with post surgical changes with lateral indentation, no lumps

## 2022-12-01 ENCOUNTER — OUTPATIENT (OUTPATIENT)
Dept: OUTPATIENT SERVICES | Facility: HOSPITAL | Age: 77
LOS: 1 days | Discharge: HOME | End: 2022-12-01

## 2022-12-01 DIAGNOSIS — C50.919 MALIGNANT NEOPLASM OF UNSPECIFIED SITE OF UNSPECIFIED FEMALE BREAST: ICD-10-CM

## 2022-12-05 ENCOUNTER — RESULT REVIEW (OUTPATIENT)
Age: 77
End: 2022-12-05

## 2022-12-05 ENCOUNTER — APPOINTMENT (OUTPATIENT)
Dept: HEMATOLOGY ONCOLOGY | Facility: CLINIC | Age: 77
End: 2022-12-05

## 2022-12-05 VITALS
BODY MASS INDEX: 25.42 KG/M2 | RESPIRATION RATE: 16 BRPM | HEIGHT: 64.96 IN | TEMPERATURE: 97 F | OXYGEN SATURATION: 97 % | WEIGHT: 152.56 LBS | HEART RATE: 72 BPM | SYSTOLIC BLOOD PRESSURE: 145 MMHG | DIASTOLIC BLOOD PRESSURE: 71 MMHG

## 2022-12-05 LAB
BASOPHILS # BLD AUTO: 0.06 K/UL — SIGNIFICANT CHANGE UP (ref 0–0.2)
BASOPHILS NFR BLD AUTO: 0.9 % — SIGNIFICANT CHANGE UP (ref 0–2)
EOSINOPHIL # BLD AUTO: 0.04 K/UL — SIGNIFICANT CHANGE UP (ref 0–0.5)
EOSINOPHIL NFR BLD AUTO: 0.6 % — SIGNIFICANT CHANGE UP (ref 0–6)
HCT VFR BLD CALC: 33.4 % — LOW (ref 34.5–45)
HGB BLD-MCNC: 11 G/DL — LOW (ref 11.5–15.5)
IMM GRANULOCYTES NFR BLD AUTO: 0.3 % — SIGNIFICANT CHANGE UP (ref 0–0.9)
LYMPHOCYTES # BLD AUTO: 1.78 K/UL — SIGNIFICANT CHANGE UP (ref 1–3.3)
LYMPHOCYTES # BLD AUTO: 26.4 % — SIGNIFICANT CHANGE UP (ref 13–44)
MCHC RBC-ENTMCNC: 32 PG — SIGNIFICANT CHANGE UP (ref 27–34)
MCHC RBC-ENTMCNC: 32.9 G/DL — SIGNIFICANT CHANGE UP (ref 32–36)
MCV RBC AUTO: 97.1 FL — SIGNIFICANT CHANGE UP (ref 80–100)
MONOCYTES # BLD AUTO: 0.84 K/UL — SIGNIFICANT CHANGE UP (ref 0–0.9)
MONOCYTES NFR BLD AUTO: 12.5 % — SIGNIFICANT CHANGE UP (ref 2–14)
NEUTROPHILS # BLD AUTO: 4 K/UL — SIGNIFICANT CHANGE UP (ref 1.8–7.4)
NEUTROPHILS NFR BLD AUTO: 59.3 % — SIGNIFICANT CHANGE UP (ref 43–77)
NRBC # BLD: 0 /100 WBCS — SIGNIFICANT CHANGE UP (ref 0–0)
PLATELET # BLD AUTO: 314 K/UL — SIGNIFICANT CHANGE UP (ref 150–400)
RBC # BLD: 3.44 M/UL — LOW (ref 3.8–5.2)
RBC # FLD: 14.2 % — SIGNIFICANT CHANGE UP (ref 10.3–14.5)
WBC # BLD: 6.74 K/UL — SIGNIFICANT CHANGE UP (ref 3.8–10.5)
WBC # FLD AUTO: 6.74 K/UL — SIGNIFICANT CHANGE UP (ref 3.8–10.5)

## 2022-12-05 PROCEDURE — 99213 OFFICE O/P EST LOW 20 MIN: CPT

## 2022-12-05 NOTE — ASSESSMENT
[FreeTextEntry1] : Ms. MATY LAGUNA  is here for a follow up appt for right breast cancer dx in 2010, and is on treatment with AI since 4/2011. She is a former patient of Dr. Gupta, tx care to me 12/2020. She completed anastrozole x 10 yrs 4/2021\par \par  BREAST CA: \par PRASANTH\par up to date with breast imaging\par \par ANEMIA\par She reports dx with anemia 1/2022. She saw hematologist in [place] as [place] goes there in [place]. No dx was made at that time. She saw another hematologist in [place]. EGD/colonoscopy was recommended. SHe had both and found to have H pylori, s/p triple therapy . PET 4/2022 PRASANTH\par \par Up to date with age appropriate malignancy screen \par Healthy diet and exercise d/w her\par RTC 1 y\par \par

## 2022-12-05 NOTE — PHYSICAL EXAM
[Fully active, able to carry on all pre-disease performance without restriction] : Status 0 - Fully active, able to carry on all pre-disease performance without restriction [Normal] : affect appropriate [de-identified] : right breast with post surgical changes with lateral indentation, no lumps

## 2022-12-05 NOTE — HISTORY OF PRESENT ILLNESS
[Disease: _____________________] : Disease: [unfilled] [T: ___] : T[unfilled] [N: ___] : N[unfilled] [M: ___] : M[unfilled] [AJCC Stage: ____] : AJCC Stage: [unfilled] [de-identified] : 76 y/o female who presents for medical oncology follow up.\par \par The patient presented 2010, at the age of 65, with an abnormal mammogram. \par \par Ultimately she underwent right lumpectomy and sentinel lymph node biopsy performed by Dr Susan Palleschi on 7/27/2010 for a 2.1 cm poorly differentiated infiltrating ductal carcinoma, which was ER+ (90%), [place] weakly + (4%) and Her 2/zoraida -. There were 2 negative sentinel lymph nodes. Oncotype DX recurrence score was 24. \par \par The patient received 4 cycles of TC chemotherapy (10/5-12/7/2010).  This was followed by RT at [facility]. \par \par She has been taking anastrozole since 4/1/2011.   \par \par Pertinent History:\par PMD: Dr. Palleschi\par Cardio: Dr. Ortega- Carotid stenosis, HLD, MR\par Endo: Dr. Mello-Hyperparathyroidism s/p parathyroidectomy 9/10/18\par DEXA, 8/20/19- Osteoporosis in forearm, osteopenia in AP Spine, Femoral neck, and total hip\par \par 12.2020: We discussed that she will complete 10 years of endocrine therapy in April/2021.  I recommend her to stop treatment at that point.  There is no data to take adjuvant endocrine therapy beyond 10 years and patient has side effects in terms of her bones and cholesterol.  Patient was in agreement to stop endocrine therapy in April 2021.  Blood work was reviewed from November 2020.\par \par  [de-identified] : Infiltrating ductal carcinoma  [de-identified] : ER+ (90%), [place] weakly + (4%) and Her 2/zoraida - [de-identified] : Oncotype DX recurrence score 24 [Treatment Protocol] : Treatment Protocol [FreeTextEntry1] : anastrozole start 4/1/2011 - 4/2021 [de-identified] : Ms. MATY LAGUNA  is here for a follow up appt for right breast cancer dx in 2010, and is on treatment with AI since 4/2011. She is a former patient of Dr. Gupta, tx care to me 12/2020. She completed anastrozole x 10 yrs 4/2021\par \par She reports dx with anemia 1/2022. She saw hematologist in [place] goes [place]. No dx was made at that time. She saw another hematologist in [place]. EGD/colonoscopy was recommended. SHe had both and found to have H pylori, s/p triple therapy . PET 4/2022 PRASANTH\par mammogram -  9/2022 bIrads 2. f/b DR Palleschi\par DEXA- 9/2022 osteopenia\par She is also diagnosed with hypercholesterolemia and has recently started simvastatin.\par Up to date with age appropriate malignancy screen \par

## 2022-12-06 LAB
ALBUMIN SERPL ELPH-MCNC: 4.6 G/DL
ALP BLD-CCNC: 90 U/L
ALT SERPL-CCNC: 11 U/L
ANION GAP SERPL CALC-SCNC: 12 MMOL/L
AST SERPL-CCNC: 19 U/L
BILIRUB SERPL-MCNC: 0.4 MG/DL
BUN SERPL-MCNC: 9 MG/DL
CALCIUM SERPL-MCNC: 8.5 MG/DL
CANCER AG27-29 SERPL-ACNC: 12.9 U/ML
CEA SERPL-MCNC: 2.2 NG/ML
CHLORIDE SERPL-SCNC: 101 MMOL/L
CO2 SERPL-SCNC: 23 MMOL/L
CREAT SERPL-MCNC: 0.58 MG/DL
EGFR: 92 ML/MIN/1.73M2
GLUCOSE SERPL-MCNC: 89 MG/DL
POTASSIUM SERPL-SCNC: 4.3 MMOL/L
PROT SERPL-MCNC: 6.7 G/DL
SODIUM SERPL-SCNC: 136 MMOL/L

## 2023-06-15 ENCOUNTER — APPOINTMENT (OUTPATIENT)
Dept: PLASTIC SURGERY | Facility: CLINIC | Age: 78
End: 2023-06-15
Payer: MEDICARE

## 2023-06-15 VITALS
TEMPERATURE: 98 F | BODY MASS INDEX: 24.59 KG/M2 | OXYGEN SATURATION: 98 % | DIASTOLIC BLOOD PRESSURE: 76 MMHG | SYSTOLIC BLOOD PRESSURE: 147 MMHG | HEART RATE: 65 BPM | WEIGHT: 153 LBS | HEIGHT: 66 IN

## 2023-06-15 PROCEDURE — 99213 OFFICE O/P EST LOW 20 MIN: CPT

## 2023-06-15 NOTE — PHYSICAL EXAM
[Normocephalic] : normocephalic [Atraumatic] : atraumatic [EOMI] : extra ocular movement intact [Sclera nonicteric] : sclera nonicteric [Supple] : supple [No Supraclavicular Adenopathy] : no supraclavicular adenopathy [Examined in the supine and seated position] : examined in the supine and seated position [No dominant masses] : no dominant masses in right breast  [No dominant masses] : no dominant masses left breast [No Nipple Retraction] : no left nipple retraction [No Nipple Discharge] : no left nipple discharge [No Axillary Lymphadenopathy] : no left axillary lymphadenopathy [No Edema] : no edema [No Rashes] : no rashes [No Ulceration] : no ulceration [de-identified] : Well-healed upper outer and axillary incisions.

## 2023-06-15 NOTE — REVIEW OF SYSTEMS
[Negative] : Heme/Lymph [FreeTextEntry5] : Mitral valve prolapse  [FreeTextEntry6] : Bronchitis  [FreeTextEntry7] : Diverticulosis [FreeTextEntry9] : Arthritis in the knee [de-identified] : Parathyroidectomy

## 2023-06-15 NOTE — CONSULT LETTER
[Dear  ___] : Dear  [unfilled], [Courtesy Letter:] : I had the pleasure of seeing your patient, [unfilled], in my office today. [Please see my note below.] : Please see my note below. [Sincerely,] : Sincerely, [FreeTextEntry3] : Sharee Persaud, RPA-C\par Breast Surgery\par [address]\par [address]\[place] [address]\par (Phone) [phone]\par (Fax) [phone]

## 2023-06-15 NOTE — ASSESSMENT
[FreeTextEntry1] : H/O right breast cancer (Stage IIA, 8/2010)\par No evidence of disease recurrence on CBE \par \par 1. Annual bilateral mammogram due 8/2023\par 2. Follow up office visit due in 1 year\par 3. Advised monthly self breast examinations and advised her to contact me if she has any concerns.

## 2023-08-21 ENCOUNTER — NON-APPOINTMENT (OUTPATIENT)
Age: 78
End: 2023-08-21

## 2023-11-20 ENCOUNTER — OUTPATIENT (OUTPATIENT)
Dept: OUTPATIENT SERVICES | Facility: HOSPITAL | Age: 78
LOS: 1 days | Discharge: HOME | End: 2023-11-20

## 2023-11-20 DIAGNOSIS — C50.919 MALIGNANT NEOPLASM OF UNSPECIFIED SITE OF UNSPECIFIED FEMALE BREAST: ICD-10-CM

## 2023-12-04 ENCOUNTER — APPOINTMENT (OUTPATIENT)
Dept: HEMATOLOGY ONCOLOGY | Facility: CLINIC | Age: 78
End: 2023-12-04

## 2024-02-29 NOTE — REASON FOR VISIT
Please increase Lasix to 20 mg twice a day for 1 week.  Please follow-up with your primary care physician and cardiologist within 2 to 3 days to monitor lower extremity edema and chest Lasix dose as needed.    Please take Augmentin 1 tablet twice a day for 5 days.   [Follow-Up Visit] : a follow-up [FreeTextEntry2] : Right breast cancer.

## 2024-05-22 ENCOUNTER — OUTPATIENT (OUTPATIENT)
Dept: OUTPATIENT SERVICES | Facility: HOSPITAL | Age: 79
LOS: 1 days | Discharge: HOME | End: 2024-05-22

## 2024-05-22 DIAGNOSIS — C50.919 MALIGNANT NEOPLASM OF UNSPECIFIED SITE OF UNSPECIFIED FEMALE BREAST: ICD-10-CM

## 2024-05-29 ENCOUNTER — APPOINTMENT (OUTPATIENT)
Dept: HEMATOLOGY ONCOLOGY | Facility: CLINIC | Age: 79
End: 2024-05-29
Payer: MEDICARE

## 2024-05-29 VITALS
SYSTOLIC BLOOD PRESSURE: 127 MMHG | TEMPERATURE: 97 F | DIASTOLIC BLOOD PRESSURE: 70 MMHG | RESPIRATION RATE: 16 BRPM | OXYGEN SATURATION: 99 % | WEIGHT: 155.86 LBS | HEART RATE: 64 BPM | BODY MASS INDEX: 25.16 KG/M2

## 2024-05-29 PROCEDURE — 99213 OFFICE O/P EST LOW 20 MIN: CPT

## 2024-05-29 PROCEDURE — G2211 COMPLEX E/M VISIT ADD ON: CPT

## 2024-05-29 NOTE — PHYSICAL EXAM
[Fully active, able to carry on all pre-disease performance without restriction] : Status 0 - Fully active, able to carry on all pre-disease performance without restriction [Normal] : affect appropriate [de-identified] : right breast with post surgical changes with lateral indentation, no lumps

## 2024-05-29 NOTE — HISTORY OF PRESENT ILLNESS
[Disease: _____________________] : Disease: [unfilled] [T: ___] : T[unfilled] [N: ___] : N[unfilled] [M: ___] : M[unfilled] [AJCC Stage: ____] : AJCC Stage: [unfilled] [Treatment Protocol] : Treatment Protocol [de-identified] : 74 y/o female who presents for medical oncology follow up.\par  \par  The patient presented 2010, at the age of 65, with an abnormal mammogram. \par  \par  Ultimately she underwent right lumpectomy and sentinel lymph node biopsy performed by Dr Susan Palleschi on 7/27/2010 for a 2.1 cm poorly differentiated infiltrating ductal carcinoma, which was ER+ (90%), [place] weakly + (4%) and Her 2/zoraida -. There were 2 negative sentinel lymph nodes. Oncotype DX recurrence score was 24. \par  \par  The patient received 4 cycles of TC chemotherapy (10/5-12/7/2010).  This was followed by RT at [facility]. \par  \par  She has been taking anastrozole since 4/1/2011.   \par  \par  Pertinent History:\par  PMD: Dr. Palleschi\par  Cardio: Dr. Ortega- Carotid stenosis, HLD, MR\par  Endo: Dr. Mello-Hyperparathyroidism s/p parathyroidectomy 9/10/18\par  DEXA, 8/20/19- Osteoporosis in forearm, osteopenia in AP Spine, Femoral neck, and total hip\par  \par  12.2020: We discussed that she will complete 10 years of endocrine therapy in April/2021.  I recommend her to stop treatment at that point.  There is no data to take adjuvant endocrine therapy beyond 10 years and patient has side effects in terms of her bones and cholesterol.  Patient was in agreement to stop endocrine therapy in April 2021.  Blood work was reviewed from November 2020.\par  \par   [de-identified] : Infiltrating ductal carcinoma  [de-identified] : ER+ (90%), [place] weakly + (4%) and Her 2/zoraida - [de-identified] : Oncotype DX recurrence score 24 [FreeTextEntry1] : anastrozole start 4/1/2011 - 4/2021 [de-identified] : Ms. MATY LAGUNA  is here for a follow up appt for right breast cancer dx in 2010, and is on treatment with AI since 4/2011. She is a former patient of Dr. Gupta, tx care to me 12/2020. She completed anastrozole x 10 yrs 4/2021  She reports dx with anemia 1/2022. She saw hematologist in [place] goes [place]. No dx was made at that time. She saw another hematologist in [place]. EGD/colonoscopy was recommended. SHe had both and found to have H pylori, s/p triple therapy . PET 4/2022 PRASANTH. Anemia is resolved  mammogram -  9/2022, 8/2023 bIrads 2. f/b DR Palleschi She is also diagnosed with hypercholesterolemia and has recently started simvastatin. She is dx with afib, on metoprolol and eliquis  Up to date with age appropriate malignancy screen

## 2024-05-29 NOTE — ASSESSMENT
[FreeTextEntry1] : Ms. MATY LAGUNA  is here for a follow up appt for right breast cancer dx in 2010, and is on treatment with AI since 4/2011. She is a former patient of Dr. Gupta, tx care to me 12/2020. She completed anastrozole x 10 yrs 4/2021   BREAST CA:  PRASANTH up to date with breast imaging Up to date with age appropriate malignancy screen  Healthy diet and exercise d/w her RTC 1 y

## 2024-06-14 ENCOUNTER — APPOINTMENT (OUTPATIENT)
Dept: PLASTIC SURGERY | Facility: CLINIC | Age: 79
End: 2024-06-14
Payer: MEDICARE

## 2024-06-14 VITALS
OXYGEN SATURATION: 99 % | TEMPERATURE: 98.2 F | WEIGHT: 155 LBS | HEART RATE: 65 BPM | SYSTOLIC BLOOD PRESSURE: 156 MMHG | DIASTOLIC BLOOD PRESSURE: 74 MMHG | BODY MASS INDEX: 24.91 KG/M2 | HEIGHT: 66 IN

## 2024-06-14 DIAGNOSIS — C50.911 MALIGNANT NEOPLASM OF UNSPECIFIED SITE OF RIGHT FEMALE BREAST: ICD-10-CM

## 2024-06-14 DIAGNOSIS — Z17.0 MALIGNANT NEOPLASM OF UNSPECIFIED SITE OF RIGHT FEMALE BREAST: ICD-10-CM

## 2024-06-14 PROCEDURE — 99213 OFFICE O/P EST LOW 20 MIN: CPT

## 2024-06-14 PROCEDURE — 99203 OFFICE O/P NEW LOW 30 MIN: CPT

## 2024-06-14 NOTE — CONSULT LETTER
[Dear  ___] : Dear  [unfilled], [Courtesy Letter:] : I had the pleasure of seeing your patient, [unfilled], in my office today. [Please see my note below.] : Please see my note below. [Sincerely,] : Sincerely, [FreeTextEntry3] : Susan M. Palleschi, MD, FACS Division of Breast Surgery Director, Breast Surgery [address] [address] (Phone) [phone] (Fax) [phone]

## 2024-06-14 NOTE — ASSESSMENT
[FreeTextEntry1] : History of right breast cancer (Stage IIA, 8/2010) No evidence of disease recurrence on clinical breast exam   1. Annual bilateral mammogram due 8/2024. 2. Follow up office visit due in 1 year. 3. Advised monthly self breast examinations and advised her to contact me if she has any concerns. 4. Follow up with the Dermatologist

## 2024-06-14 NOTE — PHYSICAL EXAM
[Normocephalic] : normocephalic [Atraumatic] : atraumatic [EOMI] : extra ocular movement intact [Sclera nonicteric] : sclera nonicteric [Supple] : supple [No Supraclavicular Adenopathy] : no supraclavicular adenopathy [Examined in the supine and seated position] : examined in the supine and seated position [No dominant masses] : no dominant masses in right breast  [No dominant masses] : no dominant masses left breast [No Nipple Retraction] : no left nipple retraction [No Nipple Discharge] : no left nipple discharge [No Axillary Lymphadenopathy] : no left axillary lymphadenopathy [No Edema] : no edema [No Rashes] : no rashes [No Ulceration] : no ulceration [de-identified] : Well-healed upper outer and axillary incisions.  [de-identified] : 3:00 (N7cm) 2 x 2 mm dark pink flat skin lesion.

## 2024-06-14 NOTE — REVIEW OF SYSTEMS
[Negative] : Heme/Lymph [FreeTextEntry5] : A-fib [de-identified] : Mohs Surgery [de-identified] : Parathyroid Surgery

## 2024-06-14 NOTE — HISTORY OF PRESENT ILLNESS
[FreeTextEntry1] : 78-year-old female here for a follow-up office visit She has a history of Stage IIA right breast cancer 8/9/2010 status post right 10:00 lumpectomy, right axillary sentinel lymph node biopsy Pathology revealed a 2.1 cm poorly differentiated infiltrating ductal carcinoma, which was ER+ (90%), [place] weakly + (4%) and Her 2/zoraida -. There were 2 negative sentinel lymph nodes. Oncotype Dx 24 Rad ONC: Dr. Stringer.  She completed XRT 3/15/2011 Med ONC: Dr. Yap (formerly treated by Dr. Gupta), status post adjuvant chemotherapy with TC x4.  She completed 10 years of Anastrozole 4/30/2021. She sa her 5/29/2024, now prn. She has a family history of breast cancer in her aunt 7/2017 status post benign right MRI core biopsy 8/21/2023 Bilateral mammogram: Benign. Stable posttreatment changes in the right breast. BI-RADS 2. She notes a left breast outer red itchy spot for a few weeks. She will be seeing her dermatologist.

## 2025-08-12 DIAGNOSIS — N63.10 UNSPECIFIED LUMP IN THE RIGHT BREAST, UNSPECIFIED QUADRANT: ICD-10-CM

## 2025-08-19 PROBLEM — R92.8 ABNORMAL FINDING ON BREAST IMAGING: Status: ACTIVE | Noted: 2025-08-19

## 2025-08-26 ENCOUNTER — APPOINTMENT (OUTPATIENT)
Dept: PLASTIC SURGERY | Facility: CLINIC | Age: 80
End: 2025-08-26

## 2025-08-26 DIAGNOSIS — R92.8 OTHER ABNORMAL AND INCONCLUSIVE FINDINGS ON DIAGNOSTIC IMAGING OF BREAST: ICD-10-CM

## 2025-08-26 DIAGNOSIS — C50.911 MALIGNANT NEOPLASM OF UNSPECIFIED SITE OF RIGHT FEMALE BREAST: ICD-10-CM

## 2025-08-26 DIAGNOSIS — Z17.0 MALIGNANT NEOPLASM OF UNSPECIFIED SITE OF RIGHT FEMALE BREAST: ICD-10-CM

## 2025-09-02 ENCOUNTER — NON-APPOINTMENT (OUTPATIENT)
Age: 80
End: 2025-09-02